# Patient Record
Sex: FEMALE | Race: WHITE | NOT HISPANIC OR LATINO | URBAN - METROPOLITAN AREA
[De-identification: names, ages, dates, MRNs, and addresses within clinical notes are randomized per-mention and may not be internally consistent; named-entity substitution may affect disease eponyms.]

---

## 2017-05-04 ENCOUNTER — OUTPATIENT (OUTPATIENT)
Dept: OUTPATIENT SERVICES | Facility: HOSPITAL | Age: 32
LOS: 1 days | End: 2017-05-04
Payer: COMMERCIAL

## 2017-05-04 DIAGNOSIS — Z00.00 ENCOUNTER FOR GENERAL ADULT MEDICAL EXAMINATION WITHOUT ABNORMAL FINDINGS: ICD-10-CM

## 2017-05-04 PROCEDURE — 36415 COLL VENOUS BLD VENIPUNCTURE: CPT

## 2017-05-04 PROCEDURE — 86480 TB TEST CELL IMMUN MEASURE: CPT

## 2017-05-05 LAB
M TB TUBERC IFN-G BLD QL: -0.02 IU/ML — SIGNIFICANT CHANGE UP
M TB TUBERC IFN-G BLD QL: 0.03 IU/ML — SIGNIFICANT CHANGE UP
M TB TUBERC IFN-G BLD QL: NEGATIVE — SIGNIFICANT CHANGE UP
MITOGEN IGNF BCKGRD COR BLD-ACNC: >10 IU/ML — SIGNIFICANT CHANGE UP

## 2018-10-15 ENCOUNTER — TRANSCRIPTION ENCOUNTER (OUTPATIENT)
Age: 33
End: 2018-10-15

## 2019-10-29 ENCOUNTER — RESULT REVIEW (OUTPATIENT)
Age: 34
End: 2019-10-29

## 2019-10-30 ENCOUNTER — APPOINTMENT (OUTPATIENT)
Dept: OBGYN | Facility: CLINIC | Age: 34
End: 2019-10-30
Payer: COMMERCIAL

## 2019-10-30 PROCEDURE — 36415 COLL VENOUS BLD VENIPUNCTURE: CPT

## 2019-10-30 PROCEDURE — 99385 PREV VISIT NEW AGE 18-39: CPT

## 2020-04-26 ENCOUNTER — MESSAGE (OUTPATIENT)
Age: 35
End: 2020-04-26

## 2020-05-26 ENCOUNTER — APPOINTMENT (OUTPATIENT)
Dept: DISASTER EMERGENCY | Facility: CLINIC | Age: 35
End: 2020-05-26

## 2020-05-26 LAB
SARS-COV-2 IGG SERPL IA-ACNC: 0.1 INDEX
SARS-COV-2 IGG SERPL QL IA: NEGATIVE

## 2022-01-03 LAB — SARS-COV-2 N GENE NPH QL NAA+PROBE: NOT DETECTED

## 2022-01-06 DIAGNOSIS — Z00.00 ENCOUNTER FOR GENERAL ADULT MEDICAL EXAMINATION W/OUT ABNORMAL FINDINGS: ICD-10-CM

## 2022-01-08 LAB — SARS-COV-2 N GENE NPH QL NAA+PROBE: NOT DETECTED

## 2022-08-18 DIAGNOSIS — J30.9 ALLERGIC RHINITIS, UNSPECIFIED: ICD-10-CM

## 2022-11-28 ENCOUNTER — NON-APPOINTMENT (OUTPATIENT)
Age: 37
End: 2022-11-28

## 2023-01-03 ENCOUNTER — APPOINTMENT (OUTPATIENT)
Dept: RADIOLOGY | Facility: IMAGING CENTER | Age: 38
End: 2023-01-03
Payer: COMMERCIAL

## 2023-01-03 ENCOUNTER — TRANSCRIPTION ENCOUNTER (OUTPATIENT)
Age: 38
End: 2023-01-03

## 2023-01-03 ENCOUNTER — APPOINTMENT (OUTPATIENT)
Dept: MRI IMAGING | Facility: IMAGING CENTER | Age: 38
End: 2023-01-03
Payer: COMMERCIAL

## 2023-01-03 ENCOUNTER — APPOINTMENT (OUTPATIENT)
Dept: SPINE | Facility: CLINIC | Age: 38
End: 2023-01-03
Payer: COMMERCIAL

## 2023-01-03 ENCOUNTER — NON-APPOINTMENT (OUTPATIENT)
Age: 38
End: 2023-01-03

## 2023-01-03 ENCOUNTER — OUTPATIENT (OUTPATIENT)
Dept: OUTPATIENT SERVICES | Facility: HOSPITAL | Age: 38
LOS: 1 days | End: 2023-01-03
Payer: COMMERCIAL

## 2023-01-03 VITALS
HEART RATE: 95 BPM | SYSTOLIC BLOOD PRESSURE: 137 MMHG | OXYGEN SATURATION: 95 % | BODY MASS INDEX: 24.33 KG/M2 | WEIGHT: 155 LBS | HEIGHT: 67 IN | DIASTOLIC BLOOD PRESSURE: 94 MMHG

## 2023-01-03 VITALS — BODY MASS INDEX: 39.94 KG/M2 | WEIGHT: 255 LBS

## 2023-01-03 DIAGNOSIS — M54.12 RADICULOPATHY, CERVICAL REGION: ICD-10-CM

## 2023-01-03 PROCEDURE — 72040 X-RAY EXAM NECK SPINE 2-3 VW: CPT | Mod: 26

## 2023-01-03 PROCEDURE — 72141 MRI NECK SPINE W/O DYE: CPT | Mod: 26

## 2023-01-03 PROCEDURE — 72141 MRI NECK SPINE W/O DYE: CPT

## 2023-01-03 PROCEDURE — 72040 X-RAY EXAM NECK SPINE 2-3 VW: CPT

## 2023-01-03 PROCEDURE — 99203 OFFICE O/P NEW LOW 30 MIN: CPT

## 2023-01-03 NOTE — REVIEW OF SYSTEMS
[As Noted in HPI] : as noted in HPI [Arm Weakness] : arm weakness [Hand Weakness] :  hand weakness [Negative] : Heme/Lymph

## 2023-01-03 NOTE — HISTORY OF PRESENT ILLNESS
[Other: ___] : [unfilled] [FreeTextEntry1] : neck pain with radiation into both shoulders and down the left arm with weakness of both arms which is worse on the left side. [de-identified] : CARTER KHAN is a 37 year old lady who reported that she had a bad cold about 1.5 weeks ago and was lying down on her right side and had a coughing attack.  She needed to turn her head to cough and felt sudden severe pain down the left arm.  She started a Medrol dose pack on Saturday but noticed that she had developed weakness in both arms but especially the left arm and has difficulty lifting her arm up.  She feels pain and pressure in her neck.  the patient denies tingling or numbness down her extremities.  She denies difficulty using her hands or difficulty walking.  She also denies bowel or bladder problems.  She has not started any physical therapy, chiropractics, acupuncture or interventional pain management.

## 2023-01-03 NOTE — ASSESSMENT
[FreeTextEntry1] : Activity levels and proper body mechanics were discussed.  The patient is to avoid bending and lifting and is to keep her phone and computer elevated.  It was recommended that she start physical therapy for arm strengthening and to relieve neck and arm pain.  The patient is to have cervical AP and lateral x rays and an MRI of the cervical spine as soon as possible to evaluate for cervical canal or foraminal stenosis which may be causing her weakness and pain.  She made an appointment to have the imaging tonight.  They will be reviewed tomorrow and the patient will be called with the results.  She is to follow up with Dr. Eddi Maya in the office on 01/09/2023.  the patient agreed with this plan.

## 2023-01-03 NOTE — PHYSICAL EXAM
[General Appearance - Alert] : alert [General Appearance - In No Acute Distress] : in no acute distress [General Appearance - Well Nourished] : well nourished [General Appearance - Well Developed] : well developed [General Appearance - Well-Appearing] : healthy appearing [] : normal voice and communication [FreeTextEntry1] : Is concerned about arm weakness. [Person] : oriented to person [Place] : oriented to place [Time] : oriented to time [Short Term Intact] : short term memory intact [Remote Intact] : remote memory intact [Span Intact] : the attention span was normal [Concentration Intact] : normal concentrating ability [Fluency] : fluency intact [Comprehension] : comprehension intact [Current Events] : adequate knowledge of current events [Past History] : adequate knowledge of personal past history [Vocabulary] : adequate range of vocabulary [Cranial Nerves Optic (II)] : visual acuity intact bilaterally,  pupils equal round and reactive to light [Cranial Nerves Oculomotor (III)] : extraocular motion intact [Cranial Nerves Trigeminal (V)] : facial sensation intact symmetrically [Cranial Nerves Facial (VII)] : face symmetrical [Cranial Nerves Vestibulocochlear (VIII)] : hearing was intact bilaterally [Cranial Nerves Glossopharyngeal (IX)] : tongue and palate midline [Cranial Nerves Accessory (XI - Cranial And Spinal)] : head turning and shoulder shrug symmetric [Cranial Nerves Hypoglossal (XII)] : there was no tongue deviation with protrusion [Motor Tone] : muscle tone was normal in all four extremities [Motor Strength] : muscle strength was normal in all four extremities [No Muscle Atrophy] : normal bulk in all four extremities [4] : C5 deltoid 4/5 [3] : C5 deltoid 3/5 [5] : S1 toe walking 5/5 [Sensation Tactile Decrease] : light touch was intact [Abnormal Walk] : normal gait [Balance] : balance was intact [Past-pointing] : there was no past-pointing [Tremor] : no tremor present [1+] : Brachioradialis left 1+ [2+] : Ankle jerk left 2+ [Plantar Reflex Right Only] : normal on the right [Plantar Reflex Left Only] : normal on the left [___] : absent on the right [___] : absent on the left [Horner] : Horner's sign was not demonstrated

## 2023-01-03 NOTE — REASON FOR VISIT
[New Patient Visit] : a new patient visit [Referred By: _________] : Patient was referred by FAZAL [FreeTextEntry1] : The patient reported having neck pain which radiates into both shoulders and down the left arm with weakness in both arms which is worse on the left side.

## 2023-01-04 RX ORDER — IPRATROPIUM BROMIDE 17 UG/1
17 AEROSOL, METERED RESPIRATORY (INHALATION) 4 TIMES DAILY
Qty: 1 | Refills: 5 | Status: DISCONTINUED | COMMUNITY
Start: 2022-08-18 | End: 2023-01-04

## 2023-01-09 ENCOUNTER — APPOINTMENT (OUTPATIENT)
Dept: SPINE | Facility: CLINIC | Age: 38
End: 2023-01-09
Payer: COMMERCIAL

## 2023-01-09 VITALS
DIASTOLIC BLOOD PRESSURE: 96 MMHG | OXYGEN SATURATION: 96 % | BODY MASS INDEX: 40.02 KG/M2 | SYSTOLIC BLOOD PRESSURE: 136 MMHG | HEART RATE: 107 BPM | HEIGHT: 67 IN | WEIGHT: 255 LBS

## 2023-01-09 DIAGNOSIS — M54.12 RADICULOPATHY, CERVICAL REGION: ICD-10-CM

## 2023-01-09 DIAGNOSIS — G62.9 POLYNEUROPATHY, UNSPECIFIED: ICD-10-CM

## 2023-01-09 PROCEDURE — 99213 OFFICE O/P EST LOW 20 MIN: CPT

## 2023-01-11 NOTE — REASON FOR VISIT
[Follow-Up: _____] : a [unfilled] follow-up visit [Other: _____] : [unfilled] [FreeTextEntry1] : Ms. Baez states she begun experiencing neck pain that radiates into bilateral shoulders on 12/20/2022 and weakness of bilateral upper extremities left greater than right on 12/30/2022. She has noted significant improvement with a Medrol Dosepak. Pain level 4/10. She is scheduled to start physical therapy this week. MRI of cervical spine and cervical x-ray reviewed today.  An MRI of the cervical spine does not show any disc herniation stenosis or neural impingement.  X-rays which were not done with flexion-extension were normal.\par

## 2023-01-11 NOTE — HISTORY OF PRESENT ILLNESS
[FreeTextEntry1] : 37 year old lady presented on 1/3/2022 with weakness in both arms but especially the left arm and has difficulty lifting her arm up for 2.5 weeks.  In additions she described pain and pressure in her neck. The patient reported she had a bad cold about 3 weeks ago and was lying down on her right side and had a coughing attack. She needed to turn her head to cough and felt sudden severe pain down the left arm. She started a Medrol dose pack on Saturday but noticed that she developed weakness of both arms. Denies tingling or numbness of extremities, gait instability, difficulty with fine or gross motor skills, bowel or bladder dysfunction.\par \par

## 2023-01-11 NOTE — ASSESSMENT
[Peripheral nerve disorder (355.9 / G64)] : percentile score [FreeTextEntry1] : 37 year old female with several weeks of bilateral deltoid weakness.  Pain has improved with a Medrol Dosepak but weakness continues.  The etiology of the symptoms remains unclear. MRI of Cervical spine does not show any neural impingement. Cervical flexion and extension x-rays ordered. Recommend doing an EMG of upper extremities. She will return after obtaining images and EMG.

## 2023-01-11 NOTE — PHYSICAL EXAM
[Sensation Tactile Decrease] : light touch was intact [Abnormal Walk] : normal gait [Horner] : Horner's sign was not demonstrated [FreeTextEntry6] : Bilateral deltoid weakness 4/5 left worse than right

## 2023-01-11 NOTE — RESULTS/DATA
[FreeTextEntry1] : MRI does not show any disc herniation stenosis or neural impingement.  X-rays which were not done with flexion-extension were normal.

## 2023-01-11 NOTE — DATA REVIEWED
[de-identified] : Cervical spine from [facility] on 1/3/2023 [de-identified] : Cervical spine from [facility] on 1/3/2023

## 2023-01-12 ENCOUNTER — APPOINTMENT (OUTPATIENT)
Dept: NEUROLOGY | Facility: CLINIC | Age: 38
End: 2023-01-12
Payer: COMMERCIAL

## 2023-01-12 VITALS — HEIGHT: 67 IN | HEART RATE: 120 BPM | SYSTOLIC BLOOD PRESSURE: 170 MMHG | DIASTOLIC BLOOD PRESSURE: 100 MMHG

## 2023-01-12 PROCEDURE — 99204 OFFICE O/P NEW MOD 45 MIN: CPT

## 2023-01-12 RX ORDER — TIZANIDINE 4 MG/1
4 TABLET ORAL
Refills: 0 | Status: DISCONTINUED | COMMUNITY
End: 2023-01-12

## 2023-01-12 NOTE — ASSESSMENT
[FreeTextEntry1] : Dr. Chambers's history and examination are consistent with bilateral brachial neuritis.  MRI of the cervical spine does not reveal a cause.\par \par I suggested that she institute physical therapy to prevent development of frozen shoulders.  I will order MRIs of the brachial plexus with and without contrast and an ultrasound of the chest to assess diaphragmatic function.  She will undergo a comprehensive serologic evaluation.  These studies can be followed by EMG and nerve conduction studies at an appropriate interval.  Further management will depend upon these results and her clinical course.

## 2023-01-12 NOTE — PHYSICAL EXAM
[FreeTextEntry1] : Constitutional:  Patient was well-developed, well-nourished and in no acute distress. \par \par Head:  Normocephalic, atraumatic. Tympanic membranes were clear. \par \par Neck:  Supple with full range of motion. \par \par Cardiovascular:  Cardiac rhythm was regular without murmur. There were no carotid bruits. Peripheral pulses were full and symmetric. \par \par Respiratory:  Lungs were clear. \par \par Abdomen:  Soft and nontender. \par \par Spine:  Nontender. \par \par Skin:  There were no rashes. \par \par NEUROLOGICAL EXAMINATION:\par \par Mental Status: Patient was alert and oriented. Speech was fluent. There was no dysarthria. \par \par Cranial Nerves: \par \par II: Visual acuity was 20/ 20 bilaterally with near card. Pupils were equal and reactive. Visual fields were full. Funduscopic examination was normal. \par \par III, IV, VI:  Eye movements were full without nystagmus. \par \par V: Facial sensation was intact. \par \par VII: Facial strength was normal. \par \par VIII: Hearing was equal. \par \par IX, X: Palatal movement was normal. Phonation was normal. \par \par XI: Sternocleidomastoids and trapezii were normal. \par \par XII: Tongue was midline and movements normal. There was no lingual atrophy or fasciculations. \par \par Motor Examination: Muscle bulk, tone and strength were normal except for the shoulder girdles.  There was no winging of the scapulas.  There was severe weakness of shoulder abduction and external rotation bilaterally, left worse than right.  There was weakness of elbow flexion and forearm supination.  Triceps and all other forearm and hand muscles were 5 out of 5.\par \par Sensory Examination: Pinprick, vibration and joint position sense were intact. \par \par Reflexes: DTRs were absent at the biceps, triceps and brachioradialis and 2+ at the knees and ankles. \par \par Plantar Responses: Plantar responses were flexor. \par \par Coordination/Cerebellar Function: There was no dysmetria on finger to nose or heel to shin testing. \par \par Gait/Stance: Gait and tandem were normal. Romberg was negative.\par

## 2023-01-12 NOTE — CONSULT LETTER
[Dear  ___] : Dear  [unfilled], [Consult Letter:] : I had the pleasure of evaluating your patient, [unfilled]. [Please see my note below.] : Please see my note below. [Consult Closing:] : Thank you very much for allowing me to participate in the care of this patient.  If you have any questions, please do not hesitate to contact me. [Sincerely,] : Sincerely, [FreeTextEntry3] : Manolo Wayne MD\par

## 2023-01-16 ENCOUNTER — LABORATORY RESULT (OUTPATIENT)
Age: 38
End: 2023-01-16

## 2023-01-17 ENCOUNTER — APPOINTMENT (OUTPATIENT)
Dept: MRI IMAGING | Facility: CLINIC | Age: 38
End: 2023-01-17
Payer: COMMERCIAL

## 2023-01-17 ENCOUNTER — NON-APPOINTMENT (OUTPATIENT)
Age: 38
End: 2023-01-17

## 2023-01-17 ENCOUNTER — OUTPATIENT (OUTPATIENT)
Dept: OUTPATIENT SERVICES | Facility: HOSPITAL | Age: 38
LOS: 1 days | End: 2023-01-17

## 2023-01-17 LAB
ALBUMIN SERPL ELPH-MCNC: 4.2 G/DL
ALP BLD-CCNC: 91 U/L
ALT SERPL-CCNC: 32 U/L
ANACR T: NEGATIVE
ANION GAP SERPL CALC-SCNC: 13 MMOL/L
APTT BLD: 29.9 SEC
AST SERPL-CCNC: 18 U/L
BASOPHILS # BLD AUTO: 0.06 K/UL
BASOPHILS NFR BLD AUTO: 0.6 %
BILIRUB SERPL-MCNC: 0.2 MG/DL
BUN SERPL-MCNC: 15 MG/DL
CALCIUM SERPL-MCNC: 9.3 MG/DL
CHLORIDE SERPL-SCNC: 100 MMOL/L
CK SERPL-CCNC: 33 U/L
CO2 SERPL-SCNC: 25 MMOL/L
COVID-19 NUCLEOCAPSID  GAM ANTIBODY INTERPRETATION: POSITIVE
COVID-19 SPIKE DOMAIN ANTIBODY INTERPRETATION: POSITIVE
CREAT SERPL-MCNC: 0.66 MG/DL
CRP SERPL-MCNC: 33 MG/L
EGFR: 115 ML/MIN/1.73M2
EOSINOPHIL # BLD AUTO: 0.07 K/UL
EOSINOPHIL NFR BLD AUTO: 0.7 %
ERYTHROCYTE [SEDIMENTATION RATE] IN BLOOD BY WESTERGREN METHOD: 41 MM/HR
ESTIMATED AVERAGE GLUCOSE: 157 MG/DL
GLUCOSE SERPL-MCNC: 109 MG/DL
HBA1C MFR BLD HPLC: 7.1 %
HCT VFR BLD CALC: 43.5 %
HCYS SERPL-MCNC: 8.1 UMOL/L
HGB BLD-MCNC: 14.1 G/DL
IMM GRANULOCYTES NFR BLD AUTO: 0.7 %
LYMPHOCYTES # BLD AUTO: 2.95 K/UL
LYMPHOCYTES NFR BLD AUTO: 28.5 %
MAN DIFF?: NORMAL
MCHC RBC-ENTMCNC: 28.7 PG
MCHC RBC-ENTMCNC: 32.4 GM/DL
MCV RBC AUTO: 88.4 FL
MONOCYTES # BLD AUTO: 0.81 K/UL
MONOCYTES NFR BLD AUTO: 7.8 %
NEUTROPHILS # BLD AUTO: 6.39 K/UL
NEUTROPHILS NFR BLD AUTO: 61.7 %
PLATELET # BLD AUTO: 289 K/UL
POTASSIUM SERPL-SCNC: 3.9 MMOL/L
PROT SERPL-MCNC: 7.4 G/DL
RBC # BLD: 4.92 M/UL
RBC # FLD: 13.2 %
RHEUMATOID FACT SER QL: <10 IU/ML
SARS-COV-2 AB SERPL IA-ACNC: >250 U/ML
SARS-COV-2 AB SERPL QL IA: 99.4 INDEX
SODIUM SERPL-SCNC: 138 MMOL/L
THYROGLOB AB SERPL-ACNC: <20 IU/ML
THYROPEROXIDASE AB SERPL IA-ACNC: <10 IU/ML
TSH SERPL-ACNC: 1.16 UIU/ML
VIT B12 SERPL-MCNC: 611 PG/ML
WBC # FLD AUTO: 10.35 K/UL

## 2023-01-17 PROCEDURE — 71552 MRI CHEST W/O & W/DYE: CPT | Mod: 26

## 2023-01-18 DIAGNOSIS — R91.1 SOLITARY PULMONARY NODULE: ICD-10-CM

## 2023-01-18 LAB
CCP AB SER IA-ACNC: <8 UNITS
RF+CCP IGG SER-IMP: NEGATIVE

## 2023-01-19 ENCOUNTER — LABORATORY RESULT (OUTPATIENT)
Age: 38
End: 2023-01-19

## 2023-01-19 LAB
ALBUMIN MFR SERPL ELPH: 51.9 %
ALBUMIN SERPL-MCNC: 3.8 G/DL
ALBUMIN/GLOB SERPL: 1.1 RATIO
ALPHA1 GLOB MFR SERPL ELPH: 4.7 %
ALPHA1 GLOB SERPL ELPH-MCNC: 0.3 G/DL
ALPHA2 GLOB MFR SERPL ELPH: 11.1 %
ALPHA2 GLOB SERPL ELPH-MCNC: 0.8 G/DL
ASIALO-GM1 ANTIBODIES, IGG/IGM: 12 IV
B-GLOBULIN MFR SERPL ELPH: 14.6 %
B-GLOBULIN SERPL ELPH-MCNC: 1.1 G/DL
DEPRECATED KAPPA LC FREE/LAMBDA SER: 1.65 RATIO
GAMMA GLOB FLD ELPH-MCNC: 1.3 G/DL
GAMMA GLOB MFR SERPL ELPH: 17.7 %
GD1A ANTIBODIES, IGG/IGM: NORMAL IV
GD1B ANTIBODIES, IGG/IGM: 10 IV
GM1 ANTIBODIES, IGG/IGM: 10 IV
GM2 ANTIBODIES, IGG/IGM: 18 IV
GQ1B ANTIBODIES, IGG/IGM: 7 IV
IGA SER QL IEP: 489 MG/DL
IGG SER QL IEP: 1258 MG/DL
IGM SER QL IEP: 98 MG/DL
INTERPRETATION SERPL IEP-IMP: NORMAL
KAPPA LC CSF-MCNC: 1.81 MG/DL
KAPPA LC SERPL-MCNC: 2.99 MG/DL
M PROTEIN SPEC IFE-MCNC: NORMAL
PROT SERPL-MCNC: 7.4 G/DL
PROT SERPL-MCNC: 7.4 G/DL

## 2023-01-20 LAB
METHYLMALONATE SERPL-SCNC: 102 NMOL/L
VIT B1 SERPL-MCNC: 148.7 NMOL/L

## 2023-01-23 LAB — MAG AB SER QL: NEGATIVE

## 2023-01-24 LAB
A PHAGOCYTOPH IGG TITR SER IF: NORMAL TITER
AMPA-R ABCBA: NEGATIVE
AMPHIPHYSIN IGG TITR SER IF: NEGATIVE TITER
B BURGDOR AB SER QL IA: NEGATIVE
B MICROTI IGG TITR SER: NORMAL TITER
CASPR2-IGG CBA, S: NEGATIVE
CV2 IGG TITR SER: NEGATIVE TITER
E CHAFFEENSIS IGG TITR SER IF: NORMAL TITER
GABA-B ABCBA: NEGATIVE
GAD65 AB SER-MCNC: 0.01 NMOL/L
GLIAL NUC TYPE 1 AB TITR SER: NEGATIVE TITER
HU1 AB TITR SER: NEGATIVE TITER
HU2 AB TITR SER IF: NEGATIVE TITER
HU3 AB TITR SER: NEGATIVE TITER
IGLON5 IFA, S: NEGATIVE
IMMUNOLOGIST REVIEW: NORMAL
LGI1-IGG CBA, S: NEGATIVE
NIF IFA, S: NEGATIVE
NMDA-R ABCBA: NEGATIVE
PCA-1 AB TITR SER: NEGATIVE TITER
PCA-2 AB TITR SER: NEGATIVE TITER
PCA-TR AB TITR SER: NEGATIVE TITER
REFLEX ADDED: NORMAL

## 2023-01-26 LAB — IGA 24H UR QL IFE: NORMAL

## 2023-02-01 ENCOUNTER — APPOINTMENT (OUTPATIENT)
Dept: NEUROLOGY | Facility: CLINIC | Age: 38
End: 2023-02-01
Payer: COMMERCIAL

## 2023-02-01 PROCEDURE — 96366 THER/PROPH/DIAG IV INF ADDON: CPT

## 2023-02-01 PROCEDURE — 96365 THER/PROPH/DIAG IV INF INIT: CPT

## 2023-02-07 ENCOUNTER — APPOINTMENT (OUTPATIENT)
Dept: NEUROLOGY | Facility: CLINIC | Age: 38
End: 2023-02-07
Payer: COMMERCIAL

## 2023-02-07 DIAGNOSIS — M54.10 RADICULOPATHY, SITE UNSPECIFIED: ICD-10-CM

## 2023-02-07 LAB — HEREDITARY NEUROPATHY PANEL: NEGATIVE

## 2023-02-07 PROCEDURE — 95913 NRV CNDJ TEST 13/> STUDIES: CPT

## 2023-02-07 PROCEDURE — 95886 MUSC TEST DONE W/N TEST COMP: CPT | Mod: RT

## 2023-02-07 NOTE — PROCEDURE
[FreeTextEntry1] : Nerve conduction studies and electromyography [FreeTextEntry2] : Wang Parsonage syndrome, bilateral brachial plexopathy [FreeTextEntry3] : Electrodiagnostic testing was performed in the upper extremities.\par \par The radial, median, ulnar and lateral antebrachial cutaneous sensory potentials and conduction velocities were normal.\par \par The median and ulnar motor distal latencies, compound muscle action potentials and conduction velocities were normal.  The radial motor distal latencies and conduction velocities were normal but the compound muscle action potentials were mildly low in amplitude possibly on a technical basis.\par \par The left ulnar and both median F waves were normal but mildly and persistent.\par \par Needle electromyography was performed in several bilateral upper extremity and cervical paraspinal muscles.  There was active denervation in all C5 and C6 innervated muscles tested except for the paraspinal muscles.  Motor units and recruitment patterns were normal except for mildly decreased recruitment in the left deltoid and supraspinatus muscles.\par \par Conclusions: This was an abnormal study.  There was electrophysiologic evidence of bilateral acute upper trunk brachial plexopathy.  There was no paraspinal muscle denervation to suggest cervical radiculopathy.  The left C5 segment appeared preferentially involved.  The other motor axonal injuries appeared mild.  There was no electrophysiologic evidence of median or ulnar neuropathy or sensorimotor polyneuropathy.

## 2023-02-17 ENCOUNTER — OUTPATIENT (OUTPATIENT)
Dept: OUTPATIENT SERVICES | Facility: HOSPITAL | Age: 38
LOS: 1 days | End: 2023-02-17
Payer: COMMERCIAL

## 2023-02-17 ENCOUNTER — APPOINTMENT (OUTPATIENT)
Dept: ULTRASOUND IMAGING | Facility: CLINIC | Age: 38
End: 2023-02-17
Payer: COMMERCIAL

## 2023-02-17 ENCOUNTER — APPOINTMENT (OUTPATIENT)
Dept: CT IMAGING | Facility: CLINIC | Age: 38
End: 2023-02-17
Payer: COMMERCIAL

## 2023-02-17 DIAGNOSIS — R91.1 SOLITARY PULMONARY NODULE: ICD-10-CM

## 2023-02-17 DIAGNOSIS — M54.10 RADICULOPATHY, SITE UNSPECIFIED: ICD-10-CM

## 2023-02-17 PROCEDURE — 76604 US EXAM CHEST: CPT | Mod: 26

## 2023-02-17 PROCEDURE — 71250 CT THORAX DX C-: CPT

## 2023-02-17 PROCEDURE — 76604 US EXAM CHEST: CPT

## 2023-02-17 PROCEDURE — 71250 CT THORAX DX C-: CPT | Mod: 26

## 2023-02-21 LAB — SENSORIMOTOR NEUROPATHY PROFILE W/ RECOMBX: NORMAL

## 2023-08-30 ENCOUNTER — NON-APPOINTMENT (OUTPATIENT)
Age: 38
End: 2023-08-30

## 2023-08-30 ENCOUNTER — APPOINTMENT (OUTPATIENT)
Dept: ORTHOPEDIC SURGERY | Facility: CLINIC | Age: 38
End: 2023-08-30
Payer: COMMERCIAL

## 2023-08-30 VITALS
HEIGHT: 67 IN | SYSTOLIC BLOOD PRESSURE: 139 MMHG | DIASTOLIC BLOOD PRESSURE: 101 MMHG | WEIGHT: 250 LBS | BODY MASS INDEX: 39.24 KG/M2 | HEART RATE: 118 BPM

## 2023-08-30 PROCEDURE — 99204 OFFICE O/P NEW MOD 45 MIN: CPT

## 2023-08-30 PROCEDURE — 73630 X-RAY EXAM OF FOOT: CPT | Mod: LT

## 2023-08-30 NOTE — DISCUSSION/SUMMARY
[de-identified] : I discussed nonoperative treatment options for their left great toe contusion. We discussed nonoperative treatments options including activity modification, therapy, bracing, nonsteroidal anti-inflammatory medications, and injections. The patient would like to continue with nonoperative treatment and would like to proceed with activity modification and over-the-counter anti-inflammatories as needed.   I discussed with them that I often prescribe an anti-inflammatory that should be taken once a day with meals to decrease pain and expedite symptom relief. They should not take this while also taking Aleve (Naprosyn), Motrin/ Advil (Ibuprofen), Toradol (ketoralac). They must stop taking it if they develops stomach pain, increased bleeding or bruising and they should follow-up with their primary care doctor for routine blood work including kidney function to monitor its effect. While it Is not a habit-forming substance, it should only  be taken as needed and to discontinue use once symptoms have resolved.  They prefer to continue with over-the-counter medication  My cumulative time spent on this patients visit included: Preparation for the visit, review of the medical records, review of pertinent diagnostic studies, examination and counseling of the patient on the above diagnosis, treatment plan and prognosis, orders of diagnostic tests, medications and/or appropriate procedures and documentation in the medical records of todays visit.
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
## 2023-08-30 NOTE — PHYSICAL EXAM
[de-identified] : Constitutional: Well-nourished, well-developed, No acute distress  Respiratory:  Good respiratory effort, no SOB  Lymphatic: No regional lymphadenopathy, no lymphedema  Psychiatric: Pleasant and normal affect, alert and oriented x3  Skin: Clean dry and intact B/L UE/LE Musculoskeletal: normal except where as noted in regional exam     Left foot/ankle  APPEARANCE: no marked deformities,or malalignment.  Mild swelling of the base of the great toe MTP and PIP POSITIVE TENDERNESS: Mild tenderness palpation along the great toe NONTENDER: medial malleolus, lateral malleolus, tibialis posterior tendon, achilles tendon, no marked thickening of tendon, ATFL, CFL, PTFL, anterior tibiofibular ligament (high ankle), sinus tarsus, deltoid ligaments, 5th metatarsal.   RANGE OF MOTION: Able to flex and extend at the great toe MTP and IP joint SPECIAL TESTS: neg anterior drawer. neg talar tilt.  NEURO: Normal sensation of LE    [de-identified] :  3 views of the left great toe were obtained today that show no fracture.  There is no malalignment. There is no joint dislocation, or degenerative changes seen. No other obvious osseous abnormality. Otherwise unremarkable.

## 2023-09-04 RX ORDER — METHYLPREDNISOLONE 4 MG/1
4 TABLET ORAL
Qty: 1 | Refills: 0 | Status: DISCONTINUED | COMMUNITY
Start: 2023-01-11 | End: 2023-09-04

## 2024-01-25 ENCOUNTER — APPOINTMENT (OUTPATIENT)
Dept: HUMAN REPRODUCTION | Facility: CLINIC | Age: 39
End: 2024-01-25
Payer: COMMERCIAL

## 2024-01-25 PROCEDURE — 36415 COLL VENOUS BLD VENIPUNCTURE: CPT

## 2024-01-25 PROCEDURE — 99205 OFFICE O/P NEW HI 60 MIN: CPT | Mod: 25

## 2024-01-25 PROCEDURE — 76830 TRANSVAGINAL US NON-OB: CPT

## 2024-02-21 ENCOUNTER — APPOINTMENT (OUTPATIENT)
Dept: HUMAN REPRODUCTION | Facility: CLINIC | Age: 39
End: 2024-02-21
Payer: COMMERCIAL

## 2024-02-21 PROCEDURE — 99215 OFFICE O/P EST HI 40 MIN: CPT

## 2024-03-01 ENCOUNTER — APPOINTMENT (OUTPATIENT)
Dept: ENDOCRINOLOGY | Facility: CLINIC | Age: 39
End: 2024-03-01
Payer: COMMERCIAL

## 2024-03-01 VITALS
BODY MASS INDEX: 39.55 KG/M2 | HEIGHT: 67 IN | OXYGEN SATURATION: 95 % | HEART RATE: 103 BPM | DIASTOLIC BLOOD PRESSURE: 101 MMHG | WEIGHT: 252 LBS | SYSTOLIC BLOOD PRESSURE: 142 MMHG | TEMPERATURE: 97.7 F

## 2024-03-01 DIAGNOSIS — I10 ESSENTIAL (PRIMARY) HYPERTENSION: ICD-10-CM

## 2024-03-01 DIAGNOSIS — E66.9 OBESITY, UNSPECIFIED: ICD-10-CM

## 2024-03-01 DIAGNOSIS — E66.9 TYPE 2 DIABETES MELLITUS WITH OTHER SPECIFIED COMPLICATION: ICD-10-CM

## 2024-03-01 DIAGNOSIS — E11.69 TYPE 2 DIABETES MELLITUS WITH OTHER SPECIFIED COMPLICATION: ICD-10-CM

## 2024-03-01 DIAGNOSIS — E78.00 PURE HYPERCHOLESTEROLEMIA, UNSPECIFIED: ICD-10-CM

## 2024-03-01 LAB — HBA1C MFR BLD HPLC: 9.7

## 2024-03-01 PROCEDURE — 83036 HEMOGLOBIN GLYCOSYLATED A1C: CPT | Mod: QW

## 2024-03-01 PROCEDURE — 99205 OFFICE O/P NEW HI 60 MIN: CPT

## 2024-03-01 RX ORDER — BLOOD-GLUCOSE,RECEIVER,CONT
EACH MISCELLANEOUS
Qty: 1 | Refills: 0 | Status: ACTIVE | COMMUNITY
Start: 2024-03-01 | End: 1900-01-01

## 2024-03-01 RX ORDER — IMMUNE GLOBULIN INFUSION (HUMAN) 100 MG/ML
10 INJECTION, SOLUTION INTRAVENOUS; SUBCUTANEOUS
Qty: 17 | Refills: 4 | Status: DISCONTINUED | OUTPATIENT
Start: 2023-01-20 | End: 2024-03-01

## 2024-03-01 RX ORDER — BLOOD-GLUCOSE SENSOR
EACH MISCELLANEOUS
Qty: 2 | Refills: 3 | Status: ACTIVE | COMMUNITY
Start: 2024-03-01 | End: 1900-01-01

## 2024-03-01 NOTE — REASON FOR VISIT
[Initial Evaluation] : an initial evaluation [Weight Management/Obesity] : weight management/obesity [DM Type 2] : DM Type 2

## 2024-03-01 NOTE — ASSESSMENT
[Diabetes Foot Care] : diabetes foot care [Long Term Vascular Complications] : long term vascular complications of diabetes [Importance of Diet and Exercise] : importance of diet and exercise to improve glycemic control, achieve weight loss and improve cardiovascular health [Self Monitoring of Blood Glucose] : self monitoring of blood glucose [Retinopathy Screening] : Patient was referred to ophthalmology for retinopathy screening [Weight Loss] : weight loss [FreeTextEntry1] : Patient is a 38 yo woman with uncontrolled diabetes and BMI 39  1. Type 2 diabetes uncontrolled requiring high medical decision making -the patient was diagnosed with diabetes around 2022 based on serum A1c of 7.2%.  She was not started on any medicines and was loss to medical follow up.  She decided to pursue cryopreservation this year and was told A1c was > 10% January 2024. Patient has established PCP care and is coming today for endocrine management -since her diagnosis, she has made significant changes to her diet and is exercising.  She is motivated to change -because the fertility treatments are a priority and on hold until A1c is at goal, I offered basal/bolus insulin. Patient declined in favor of oral medicines and weekly GLP 1 agonist -start metformin 1000 mg BID; GI side effects including diarrhea were discussed; patient would benefit from tirzepatide.  Rx for 2.5 mg weekly sent to pharmacy. Side effects including pancreatitis, early signal for depression were discussed. Denies personal/family hx of MEN/MTC -POCT A1c today is 9.7% -the patient was educated that GLP 1 agonists and metformin are NOT approved during pregnancy or breast feeding.  The effects during fertility treatment also remains unknown as drug trials are not commonly done in women of child bearing age/pregnancy -refer to nutrition -encourage glucometer check; teaching provided -CGM sent for use as well -monofilament testing done -screen for nephropathy -the patient will have to discontinue GLP 1 agonist before any anesthesia or surgery  2. Class II Obesity/BMI 39 Patient states struggles with weight loss over a long period of time. She has made changes to her diet since January 2024 -recommend nutritional counseling -discussed concept of calorie restriction -can continue with time restricted feeding.  3. HTN -patient has some nervousness today -repeat BP at next visit; managed by PCP  Follow up in 3-4 months

## 2024-03-01 NOTE — PHYSICAL EXAM
[Alert] : alert [No Acute Distress] : no acute distress [Thyroid Not Enlarged] : the thyroid was not enlarged [No Respiratory Distress] : no respiratory distress [Clear to Auscultation] : lungs were clear to auscultation bilaterally [Normal Bowel Sounds] : normal bowel sounds [Soft] : abdomen soft [No Rash] : no rash [Left foot was examined, including] : left foot ~C was examined, including visual inspection with sensory and pulse exams [Right foot was examined, including] : right foot ~C was examined, including visual inspection with sensory and pulse exams [2+] : 2+ in the dorsalis pedis [Oriented x3] : oriented to person, place, and time [Normal Affect] : the affect was normal [Normal Insight/Judgement] : insight and judgment were intact [Normal Mood] : the mood was normal [Foot Ulcers] : no foot ulcers [#1 Diminished] : number 1 was normal [#2 Diminished] : number 2 was normal [#3 Diminished] : number 3 was normal [#5 Diminished] : number 5 was normal [#4 Diminished] : number 4 was normal [#6 Diminished] : number 6 was normal [#7 Diminished] : number 7 was normal [#8 Diminished] : number 8 was normal [#10 Diminished] : number 10 was normal [#9 Diminished] : number 9 was normal

## 2024-03-01 NOTE — HISTORY OF PRESENT ILLNESS
[FreeTextEntry1] : Patient is a 38 yo woman here for weight consultation and diabetes.    She states she has always been the "fluffy daughter." Sister and parents were normal weight.  Patient reports she had always been "chubby." There was overweight as a child but no obesity.  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  She admits to being an emotional eater so medical school and training contributed to weight gain.  There was gradual weight gain.  Then, in the beginning of COVID, she had a really bad relationship change and it was a difficult time. There was admitted unhealthy eating and poor sleep.  December 2022, patient developed neuritis and sought medical care for the first time.  A1c was 7.2% at that time.  Patient thought she could handle it by diet and exercise.  She ate healthy and then returned to prior eating habits.  When she turned 39 years old this year, she saw fertility specialist and A1c was 10.2% January 2024.  She established PCP care February 2024 and is here today.  Says she joined the gym and is exercising, more mindful of food, managing stress and sleeping better. PCP wanted to start Moun[employer] but it was just denied.  The patient states that the past few decades have been hard for her. There has been weight gain and she is coming out of denial requesting help with weight. She recently started to do IF, fasts 14 hours daily.  Right now, she is doing time restricted feeding and window is between 11 AM - 8 PM.  Never did commercial meal programs Meal #1: starts off with lunch; sandwich or salad with protein with vegetables; whole wheat bread.  Prepared foods at home or orders food from [employer]. Will order food once a week.  Prior to this change, she used to buy food daily.  She loves pasta, bread, carbohydrates. Meal #2: dinner is usually meat and potatoes, vegetables, She used to snack frequently but has cut down on snacking.  Patient will have apple with peanut butter if craving sweets. In the past, she used to eat cookies. Used to drink soda and juice occasionally but not anymore She joined [employer] and exercises 2-3 times a week  Type 2 diabetes diagnosed around 2022 Not currently on medicine Has not had dilated eye exam Periods are regular, except when things are stressful

## 2024-03-04 LAB
CREAT SPEC-SCNC: 181 MG/DL
MICROALBUMIN 24H UR DL<=1MG/L-MCNC: 1.3 MG/DL
MICROALBUMIN/CREAT 24H UR-RTO: 7 MG/G

## 2024-03-05 RX ORDER — LANCETS
EACH MISCELLANEOUS
Qty: 1 | Refills: 3 | Status: DISCONTINUED | COMMUNITY
Start: 2024-03-01 | End: 2024-03-05

## 2024-03-05 RX ORDER — BLOOD SUGAR DIAGNOSTIC
STRIP MISCELLANEOUS
Qty: 100 | Refills: 3 | Status: DISCONTINUED | COMMUNITY
Start: 2024-03-01 | End: 2024-03-05

## 2024-03-05 RX ORDER — TIRZEPATIDE 2.5 MG/.5ML
2.5 INJECTION, SOLUTION SUBCUTANEOUS
Qty: 4 | Refills: 3 | Status: ACTIVE | COMMUNITY
Start: 2024-03-01 | End: 1900-01-01

## 2024-03-05 RX ORDER — LANCETS 33 GAUGE
EACH MISCELLANEOUS
Qty: 180 | Refills: 0 | Status: ACTIVE | COMMUNITY
Start: 2024-03-05 | End: 1900-01-01

## 2024-03-05 RX ORDER — BLOOD SUGAR DIAGNOSTIC
STRIP MISCELLANEOUS TWICE DAILY
Qty: 2 | Refills: 3 | Status: ACTIVE | COMMUNITY
Start: 2024-03-05 | End: 1900-01-01

## 2024-03-05 RX ORDER — BLOOD-GLUCOSE METER
W/DEVICE EACH MISCELLANEOUS
Qty: 1 | Refills: 3 | Status: DISCONTINUED | COMMUNITY
Start: 2024-03-01 | End: 2024-03-05

## 2024-03-05 RX ORDER — BLOOD-GLUCOSE METER
W/DEVICE EACH MISCELLANEOUS
Qty: 1 | Refills: 0 | Status: ACTIVE | COMMUNITY
Start: 2024-03-05 | End: 1900-01-01

## 2024-03-22 ENCOUNTER — TRANSCRIPTION ENCOUNTER (OUTPATIENT)
Age: 39
End: 2024-03-22

## 2024-03-22 RX ORDER — SEMAGLUTIDE 0.68 MG/ML
2 INJECTION, SOLUTION SUBCUTANEOUS
Qty: 4 | Refills: 3 | Status: ACTIVE | COMMUNITY
Start: 2024-03-22 | End: 1900-01-01

## 2024-04-03 ENCOUNTER — APPOINTMENT (OUTPATIENT)
Dept: ULTRASOUND IMAGING | Facility: CLINIC | Age: 39
End: 2024-04-03

## 2024-04-03 ENCOUNTER — APPOINTMENT (OUTPATIENT)
Dept: CT IMAGING | Facility: CLINIC | Age: 39
End: 2024-04-03

## 2024-04-03 ENCOUNTER — APPOINTMENT (OUTPATIENT)
Dept: MAMMOGRAPHY | Facility: CLINIC | Age: 39
End: 2024-04-03

## 2024-04-04 ENCOUNTER — APPOINTMENT (OUTPATIENT)
Dept: CT IMAGING | Facility: IMAGING CENTER | Age: 39
End: 2024-04-04

## 2024-04-04 ENCOUNTER — APPOINTMENT (OUTPATIENT)
Dept: MAMMOGRAPHY | Facility: IMAGING CENTER | Age: 39
End: 2024-04-04

## 2024-04-04 ENCOUNTER — APPOINTMENT (OUTPATIENT)
Dept: ULTRASOUND IMAGING | Facility: IMAGING CENTER | Age: 39
End: 2024-04-04

## 2024-04-05 ENCOUNTER — APPOINTMENT (OUTPATIENT)
Dept: ULTRASOUND IMAGING | Facility: IMAGING CENTER | Age: 39
End: 2024-04-05

## 2024-04-05 ENCOUNTER — APPOINTMENT (OUTPATIENT)
Dept: CT IMAGING | Facility: IMAGING CENTER | Age: 39
End: 2024-04-05
Payer: COMMERCIAL

## 2024-04-05 ENCOUNTER — OUTPATIENT (OUTPATIENT)
Dept: OUTPATIENT SERVICES | Facility: HOSPITAL | Age: 39
LOS: 1 days | End: 2024-04-05
Payer: COMMERCIAL

## 2024-04-05 ENCOUNTER — APPOINTMENT (OUTPATIENT)
Dept: MAMMOGRAPHY | Facility: IMAGING CENTER | Age: 39
End: 2024-04-05

## 2024-04-05 DIAGNOSIS — R91.1 SOLITARY PULMONARY NODULE: ICD-10-CM

## 2024-04-05 PROCEDURE — 71250 CT THORAX DX C-: CPT | Mod: 26

## 2024-04-05 PROCEDURE — 77067 SCR MAMMO BI INCL CAD: CPT | Mod: 26

## 2024-04-05 PROCEDURE — 77063 BREAST TOMOSYNTHESIS BI: CPT

## 2024-04-05 PROCEDURE — 77063 BREAST TOMOSYNTHESIS BI: CPT | Mod: 26

## 2024-04-05 PROCEDURE — 71250 CT THORAX DX C-: CPT

## 2024-04-05 PROCEDURE — 77067 SCR MAMMO BI INCL CAD: CPT

## 2024-04-17 ENCOUNTER — APPOINTMENT (OUTPATIENT)
Dept: MAMMOGRAPHY | Facility: CLINIC | Age: 39
End: 2024-04-17
Payer: COMMERCIAL

## 2024-04-17 ENCOUNTER — OUTPATIENT (OUTPATIENT)
Dept: OUTPATIENT SERVICES | Facility: HOSPITAL | Age: 39
LOS: 1 days | End: 2024-04-17
Payer: COMMERCIAL

## 2024-04-17 ENCOUNTER — APPOINTMENT (OUTPATIENT)
Dept: ULTRASOUND IMAGING | Facility: CLINIC | Age: 39
End: 2024-04-17
Payer: COMMERCIAL

## 2024-04-17 DIAGNOSIS — Z00.8 ENCOUNTER FOR OTHER GENERAL EXAMINATION: ICD-10-CM

## 2024-04-17 PROCEDURE — 77065 DX MAMMO INCL CAD UNI: CPT | Mod: 26,RT

## 2024-04-17 PROCEDURE — G0279: CPT | Mod: 26

## 2024-04-17 PROCEDURE — 76642 ULTRASOUND BREAST LIMITED: CPT | Mod: 26,RT

## 2024-04-17 PROCEDURE — 77065 DX MAMMO INCL CAD UNI: CPT

## 2024-04-17 PROCEDURE — 76642 ULTRASOUND BREAST LIMITED: CPT

## 2024-04-17 PROCEDURE — G0279: CPT

## 2024-04-30 ENCOUNTER — APPOINTMENT (OUTPATIENT)
Dept: DERMATOLOGY | Facility: CLINIC | Age: 39
End: 2024-04-30
Payer: COMMERCIAL

## 2024-04-30 DIAGNOSIS — D23.9 OTHER BENIGN NEOPLASM OF SKIN, UNSPECIFIED: ICD-10-CM

## 2024-04-30 DIAGNOSIS — I78.8 OTHER DISEASES OF CAPILLARIES: ICD-10-CM

## 2024-04-30 DIAGNOSIS — D22.9 MELANOCYTIC NEVI, UNSPECIFIED: ICD-10-CM

## 2024-04-30 PROCEDURE — 99203 OFFICE O/P NEW LOW 30 MIN: CPT

## 2024-04-30 NOTE — PHYSICAL EXAM
[Alert] : alert [Oriented x 3] : ~L oriented x 3 [Declined] : declined [FreeTextEntry3] : Focused exam: -pink hypopigmented firm papule on R upper arm -mid abdomen brown symmetric macule -L arm tan papule -L lower leg pink patch

## 2024-04-30 NOTE — ASSESSMENT
[FreeTextEntry1] : #dermatofibroma #benign nevi #capillaritis chronic, stable -reassurance regarding benign nature -advised pt to RTC for TBSE given her history of sun exposure    RTC PRN

## 2024-04-30 NOTE — HISTORY OF PRESENT ILLNESS
[FreeTextEntry1] : skin lesions [de-identified] : 40yo F presents for evaluation of skin lesions No personal or family hx of skin cancer hx of blistering sunburns during childhood, hx of tanning bed use - unsure of how many times, but used for a few years skin lesions of concern on the R upper arm, mid abdomen, L arm, L lower leg

## 2024-05-31 ENCOUNTER — APPOINTMENT (OUTPATIENT)
Dept: OBGYN | Facility: CLINIC | Age: 39
End: 2024-05-31
Payer: COMMERCIAL

## 2024-05-31 ENCOUNTER — LABORATORY RESULT (OUTPATIENT)
Age: 39
End: 2024-05-31

## 2024-05-31 VITALS
DIASTOLIC BLOOD PRESSURE: 94 MMHG | BODY MASS INDEX: 39.24 KG/M2 | SYSTOLIC BLOOD PRESSURE: 138 MMHG | WEIGHT: 250 LBS | HEIGHT: 67 IN

## 2024-05-31 DIAGNOSIS — E11.9 TYPE 2 DIABETES MELLITUS W/OUT COMPLICATIONS: ICD-10-CM

## 2024-05-31 PROCEDURE — 36415 COLL VENOUS BLD VENIPUNCTURE: CPT

## 2024-05-31 PROCEDURE — 99386 PREV VISIT NEW AGE 40-64: CPT

## 2024-06-02 LAB
ESTIMATED AVERAGE GLUCOSE: 171 MG/DL
HBA1C MFR BLD HPLC: 7.6 %

## 2024-06-02 NOTE — PLAN
[FreeTextEntry1] : #HCM -Breast exam declined - pt reports last clinical breast exam was in 2019 but admits to doing self-checks. R/B of clinical breast exam and limitation of mammo/sono reviewed w/ pt. last mammo/sono 1 month ago WNL per pt -STI Screening declined -Pap/HPV today -Immunizations: UTD  #h/o DMII -A1C drawn today per pt request  RTO in 1 year for annual GYN exam or PRN for any GYN complaints DAKOTA Soriano MD

## 2024-06-02 NOTE — HISTORY OF PRESENT ILLNESS
[Patient reported mammogram was normal] : Patient reported mammogram was normal [Patient reported breast sonogram was normal] : Patient reported breast sonogram was normal [Patient reported PAP Smear was normal] : Patient reported PAP Smear was normal [No] : Patient does not have concerns regarding sex [Currently Active] : currently active [FreeTextEntry1] : CARTER KHAN 39 year old, G0, LMP: 5/3/24, presents to establish gyn care and for an annual gyn exam. She has normal menses. No vaginal discharge or vaginitis symptoms. She denies abdominal and pelvic pain. No urinary complaints. BM is normal per patient.  Pt is planning to undergo egg preservation with MICHEL Suggs. She reports her last A1C was about 2 months ago but desires a redraw today before the egg retrieval.   Obhx: G0 GYNhx: denies PMH: DMII PSH: denies Med: Metformin 1000 BID, Ozempic All: NKDA Soc: denies Psych: denies Famhx: denies   Health maintenance: Immunizations (flu, COVID): UTD [TextBox_19] : normal per pt [Mammogramdate] : 2024 [BreastSonogramDate] : 2024 [PapSmeardate] : 2019

## 2024-06-02 NOTE — PHYSICAL EXAM
[Chaperone Present] : A chaperone was present in the examining room during all aspects of the physical examination [[id number]] : A chaperone was present during the pelvic exam. [Appropriately responsive] : appropriately responsive [Alert] : alert [No Acute Distress] : no acute distress [No Lymphadenopathy] : no lymphadenopathy [Regular Rate Rhythm] : regular rate rhythm [No Murmurs] : no murmurs [Clear to Auscultation B/L] : clear to auscultation bilaterally [Soft] : soft [Non-tender] : non-tender [Non-distended] : non-distended [No HSM] : No HSM [No Lesions] : no lesions [No Mass] : no mass [Oriented x3] : oriented x3 [Labia Majora] : normal [Labia Minora] : normal [Normal] : normal [Uterine Adnexae] : normal [FreeTextEntry2] : scribe [Declined] : Patient declined rectal exam

## 2024-06-10 ENCOUNTER — NON-APPOINTMENT (OUTPATIENT)
Age: 39
End: 2024-06-10

## 2024-06-10 LAB
CYTOLOGY CVX/VAG DOC THIN PREP: NORMAL
HPV HIGH+LOW RISK DNA PNL CVX: DETECTED

## 2024-06-24 ENCOUNTER — RX RENEWAL (OUTPATIENT)
Age: 39
End: 2024-06-24

## 2024-06-24 RX ORDER — METFORMIN HYDROCHLORIDE 1000 MG/1
1000 TABLET, COATED ORAL TWICE DAILY
Qty: 60 | Refills: 0 | Status: ACTIVE | COMMUNITY
Start: 2024-03-01 | End: 1900-01-01

## 2024-06-28 ENCOUNTER — APPOINTMENT (OUTPATIENT)
Dept: HUMAN REPRODUCTION | Facility: CLINIC | Age: 39
End: 2024-06-28
Payer: COMMERCIAL

## 2024-06-28 ENCOUNTER — APPOINTMENT (OUTPATIENT)
Dept: OBGYN | Facility: CLINIC | Age: 39
End: 2024-06-28

## 2024-06-28 PROCEDURE — 76857 US EXAM PELVIC LIMITED: CPT

## 2024-06-28 PROCEDURE — 36415 COLL VENOUS BLD VENIPUNCTURE: CPT

## 2024-06-28 PROCEDURE — 99213 OFFICE O/P EST LOW 20 MIN: CPT | Mod: 25

## 2024-07-11 ENCOUNTER — APPOINTMENT (OUTPATIENT)
Age: 39
End: 2024-07-11

## 2024-07-17 ENCOUNTER — APPOINTMENT (OUTPATIENT)
Dept: CARDIOLOGY | Facility: CLINIC | Age: 39
End: 2024-07-17

## 2024-07-18 ENCOUNTER — NON-APPOINTMENT (OUTPATIENT)
Age: 39
End: 2024-07-18

## 2024-07-18 ENCOUNTER — APPOINTMENT (OUTPATIENT)
Age: 39
End: 2024-07-18
Payer: COMMERCIAL

## 2024-07-18 VITALS
TEMPERATURE: 97 F | HEIGHT: 67 IN | SYSTOLIC BLOOD PRESSURE: 137 MMHG | HEART RATE: 117 BPM | OXYGEN SATURATION: 97 % | WEIGHT: 246 LBS | DIASTOLIC BLOOD PRESSURE: 97 MMHG | BODY MASS INDEX: 38.61 KG/M2 | RESPIRATION RATE: 16 BRPM

## 2024-07-18 DIAGNOSIS — E78.00 PURE HYPERCHOLESTEROLEMIA, UNSPECIFIED: ICD-10-CM

## 2024-07-18 DIAGNOSIS — E66.9 OBESITY, UNSPECIFIED: ICD-10-CM

## 2024-07-18 DIAGNOSIS — E11.9 TYPE 2 DIABETES MELLITUS W/OUT COMPLICATIONS: ICD-10-CM

## 2024-07-18 DIAGNOSIS — R00.0 TACHYCARDIA, UNSPECIFIED: ICD-10-CM

## 2024-07-18 DIAGNOSIS — R94.31 ABNORMAL ELECTROCARDIOGRAM [ECG] [EKG]: ICD-10-CM

## 2024-07-18 DIAGNOSIS — I10 ESSENTIAL (PRIMARY) HYPERTENSION: ICD-10-CM

## 2024-07-18 PROCEDURE — 99204 OFFICE O/P NEW MOD 45 MIN: CPT | Mod: 25

## 2024-07-18 PROCEDURE — 93000 ELECTROCARDIOGRAM COMPLETE: CPT

## 2024-07-18 RX ORDER — LABETALOL HYDROCHLORIDE 100 MG/1
100 TABLET, FILM COATED ORAL
Qty: 60 | Refills: 3 | Status: ACTIVE | COMMUNITY
Start: 2024-07-18 | End: 1900-01-01

## 2024-07-22 LAB
ALBUMIN SERPL ELPH-MCNC: 4.3 G/DL
ALP BLD-CCNC: 75 U/L
ALT SERPL-CCNC: 68 U/L
ANION GAP SERPL CALC-SCNC: 13 MMOL/L
AST SERPL-CCNC: 44 U/L
BILIRUB SERPL-MCNC: 0.3 MG/DL
BUN SERPL-MCNC: 13 MG/DL
CALCIUM SERPL-MCNC: 9.3 MG/DL
CHLORIDE SERPL-SCNC: 101 MMOL/L
CHOLEST SERPL-MCNC: 186 MG/DL
CO2 SERPL-SCNC: 25 MMOL/L
CREAT SERPL-MCNC: 0.74 MG/DL
EGFR: 105 ML/MIN/1.73M2
GLUCOSE SERPL-MCNC: 112 MG/DL
HCT VFR BLD CALC: 44 %
HDLC SERPL-MCNC: 45 MG/DL
HGB BLD-MCNC: 13.7 G/DL
LDLC SERPL CALC-MCNC: 123 MG/DL
MCHC RBC-ENTMCNC: 28.7 PG
MCHC RBC-ENTMCNC: 31.1 GM/DL
MCV RBC AUTO: 92.2 FL
NONHDLC SERPL-MCNC: 141 MG/DL
PLATELET # BLD AUTO: 342 K/UL
POTASSIUM SERPL-SCNC: 5 MMOL/L
PROT SERPL-MCNC: 7.7 G/DL
RBC # BLD: 4.77 M/UL
RBC # FLD: 13.4 %
SODIUM SERPL-SCNC: 139 MMOL/L
TRIGL SERPL-MCNC: 95 MG/DL
TSH SERPL-ACNC: 0.84 UIU/ML
WBC # FLD AUTO: 14.63 K/UL

## 2024-08-10 ENCOUNTER — APPOINTMENT (OUTPATIENT)
Dept: CARDIOLOGY | Facility: CLINIC | Age: 39
End: 2024-08-10

## 2024-08-10 PROCEDURE — 93306 TTE W/DOPPLER COMPLETE: CPT

## 2024-08-15 ENCOUNTER — APPOINTMENT (OUTPATIENT)
Age: 39
End: 2024-08-15
Payer: COMMERCIAL

## 2024-08-15 VITALS
HEART RATE: 96 BPM | TEMPERATURE: 97.7 F | SYSTOLIC BLOOD PRESSURE: 138 MMHG | DIASTOLIC BLOOD PRESSURE: 96 MMHG | RESPIRATION RATE: 16 BRPM | HEIGHT: 67 IN | WEIGHT: 246 LBS | OXYGEN SATURATION: 98 % | BODY MASS INDEX: 38.61 KG/M2

## 2024-08-15 DIAGNOSIS — I10 ESSENTIAL (PRIMARY) HYPERTENSION: ICD-10-CM

## 2024-08-15 DIAGNOSIS — E78.00 PURE HYPERCHOLESTEROLEMIA, UNSPECIFIED: ICD-10-CM

## 2024-08-15 DIAGNOSIS — E66.9 OBESITY, UNSPECIFIED: ICD-10-CM

## 2024-08-15 DIAGNOSIS — E11.9 TYPE 2 DIABETES MELLITUS W/OUT COMPLICATIONS: ICD-10-CM

## 2024-08-15 PROCEDURE — 99213 OFFICE O/P EST LOW 20 MIN: CPT

## 2024-08-15 NOTE — PHYSICAL EXAM
[Well Developed] : well developed [Well Nourished] : well nourished [No Acute Distress] : no acute distress [Obese] : obese [Normal Venous Pressure] : normal venous pressure [No Carotid Bruit] : no carotid bruit [Normal S1, S2] : normal S1, S2 [No Murmur] : no murmur [No Rub] : no rub [No Gallop] : no gallop [Clear Lung Fields] : clear lung fields [Good Air Entry] : good air entry [No Respiratory Distress] : no respiratory distress  [No Edema] : no edema [No Cyanosis] : no cyanosis [No Clubbing] : no clubbing [Moves all extremities] : moves all extremities [No Focal Deficits] : no focal deficits [Normal Speech] : normal speech [Alert and Oriented] : alert and oriented [Normal memory] : normal memory [de-identified] : Mildly tachy

## 2024-08-15 NOTE — ASSESSMENT
[FreeTextEntry1] : CARTER KHAN is a 39 year F with past medical history significant for DM2, BMI 38, HLD, abn ECG, She is here for pre-op cardiac evaluation as she is planning to have egg preservation.  BP is better but not optimally controlled. Echo showed that she has a structurally normal heart. No cardiac complaints at this time. She reports good exercise capacity. >4METS  - I reviewed labs  - I reviewed echo report - for better BP control --> increase labetalol 200 bid.  - She is stable from cardiac standpoint. There is no cardiac contraindication to having planned low risk procedure. She may proceed to have procedure.  - Increase ambulation as tolerated to aim for approximately 30 minutes of moderate activity 5 days a week.  Heart healthy diet low in sodium, sugars and saturated fats and high in fruits, vegetables and whole grains.  Weight loss.   Patient advised to go to the nearest ED whenever any symptoms persist and/or worsens.  Patient/Family/Caregiver verbalized complete understanding of these instructions.  I spent a total of 25 minutes with more than 50% spent on counseling and coordinating care.  RTO in 6 mo

## 2024-08-15 NOTE — PHYSICAL EXAM
[Well Developed] : well developed [Well Nourished] : well nourished [No Acute Distress] : no acute distress [Obese] : obese [Normal Venous Pressure] : normal venous pressure [No Carotid Bruit] : no carotid bruit [Normal S1, S2] : normal S1, S2 [No Murmur] : no murmur [No Rub] : no rub [No Gallop] : no gallop [Clear Lung Fields] : clear lung fields [Good Air Entry] : good air entry [No Respiratory Distress] : no respiratory distress  [No Edema] : no edema [No Cyanosis] : no cyanosis [No Clubbing] : no clubbing [Moves all extremities] : moves all extremities [No Focal Deficits] : no focal deficits [Normal Speech] : normal speech [Alert and Oriented] : alert and oriented [Normal memory] : normal memory [de-identified] : Mildly tachy

## 2024-08-15 NOTE — REVIEW OF SYSTEMS
[Fever] : no fever [Headache] : no headache [Chills] : no chills [Feeling Fatigued] : not feeling fatigued [SOB] : no shortness of breath [Dyspnea on exertion] : not dyspnea during exertion [Chest Discomfort] : no chest discomfort [Lower Ext Edema] : no extremity edema [Leg Claudication] : no intermittent leg claudication [Palpitations] : no palpitations [Orthopnea] : no orthopnea [PND] : no PND [Syncope] : no syncope [Cough] : no cough [Wheezing] : no wheezing [Coughing Up Blood] : no hemoptysis [Dizziness] : no dizziness [Tremor] : no tremor was seen [Convulsions] : no convulsions [Tingling (Paresthesia)] : no tingling [Weakness] : no weakness [Limb Weakness (Paresis)] : no limb weakness (Paresis) [Speech Disturbance] : no speech disturbance [Confusion] : no confusion was observed [Depression] : no depression [Anxiety] : no anxiety [Under Stress] : not under stress [Suicidal] : not suicidal

## 2024-08-15 NOTE — HISTORY OF PRESENT ILLNESS
[FreeTextEntry1] : CARTER KHAN is a 39 year y.o. F with medical history significant for DM2 (dx 2022), hepatic steatosis, HLD, depression/anxiety disorder, BMI=38. She is here for pre-op cardiac clearance as she is planning to undergo an egg preservation  She was advised to see a cardiologist due to noted elevated BP readings at doctor visits.  DBP in the 90s. She is also noted to have mild sinus tachy.  She recently started ozempic and has lost about 10lbs  She denies CP, SOB, palpitations, orthopnea, dizziness, syncope, LH, edema Patient denies changes in her exercise tolerance during the past 6 months. She can walk 10 blocks and can climb 3 flights of stairs.  Sleeps with 1 pillow  She denies history of MI, CVA, smoking Denies family history of premature ASCVD and /or SCD  No hospitalizations in the past 3 years.    DATA ECG (7/18/24): ST at 104 bpm w nl axis, LVH, PRWP (suggesting old anterior MI) and ST abn (Strain vs ischemia).  LABS (7/18/24): K 5.0, CRE 0.74, AST 44, ALT 68, ALK 75, eGFR 105, TG 95, , HDL 45, , NON-, TSH 0.84, HCT 44.0, HGB 13.7. (5/31/24): A1c 7.6 (1/25/24): A1c 10.2 (1/16/23): Hgb 14.1; Hct 43.5; ESR 41; Homocysteine 8.1; Cre 0.66; K 3.9; eGFR 115; LFTs wnl; TSH 1.16; CRP 33; A1c 7.1; Borrelia AB Neg  ECHO (8/10/24): EF 65-70%. Physiologic MR and TR.   CT chest (2/17/23): Sub 6 mm left apical perifissural nodule, corresponding to finding described on prior MRI. Consider a repeat chest CT in 1yr depending on risk factors. Hepatic steatosis

## 2024-08-23 PROBLEM — R01.1 MURMUR: Status: ACTIVE | Noted: 2024-08-23

## 2024-08-30 ENCOUNTER — APPOINTMENT (OUTPATIENT)
Dept: HUMAN REPRODUCTION | Facility: CLINIC | Age: 39
End: 2024-08-30
Payer: COMMERCIAL

## 2024-08-30 PROCEDURE — 76830 TRANSVAGINAL US NON-OB: CPT

## 2024-08-30 PROCEDURE — 36415 COLL VENOUS BLD VENIPUNCTURE: CPT

## 2024-08-30 PROCEDURE — S4042: CPT

## 2024-08-30 PROCEDURE — 99213 OFFICE O/P EST LOW 20 MIN: CPT | Mod: 25

## 2024-09-02 ENCOUNTER — APPOINTMENT (OUTPATIENT)
Dept: HUMAN REPRODUCTION | Facility: CLINIC | Age: 39
End: 2024-09-02
Payer: COMMERCIAL

## 2024-09-02 PROCEDURE — 99213 OFFICE O/P EST LOW 20 MIN: CPT | Mod: 25

## 2024-09-02 PROCEDURE — 36415 COLL VENOUS BLD VENIPUNCTURE: CPT

## 2024-09-02 PROCEDURE — 76857 US EXAM PELVIC LIMITED: CPT

## 2024-09-04 ENCOUNTER — APPOINTMENT (OUTPATIENT)
Dept: HUMAN REPRODUCTION | Facility: CLINIC | Age: 39
End: 2024-09-04
Payer: COMMERCIAL

## 2024-09-04 PROCEDURE — 76857 US EXAM PELVIC LIMITED: CPT

## 2024-09-04 PROCEDURE — 36415 COLL VENOUS BLD VENIPUNCTURE: CPT

## 2024-09-04 PROCEDURE — 99213 OFFICE O/P EST LOW 20 MIN: CPT | Mod: 25

## 2024-09-06 ENCOUNTER — APPOINTMENT (OUTPATIENT)
Dept: HUMAN REPRODUCTION | Facility: CLINIC | Age: 39
End: 2024-09-06
Payer: COMMERCIAL

## 2024-09-06 PROCEDURE — 99213 OFFICE O/P EST LOW 20 MIN: CPT | Mod: 25

## 2024-09-06 PROCEDURE — 99459 PELVIC EXAMINATION: CPT

## 2024-09-06 PROCEDURE — 76857 US EXAM PELVIC LIMITED: CPT

## 2024-09-06 PROCEDURE — 36415 COLL VENOUS BLD VENIPUNCTURE: CPT

## 2024-09-09 ENCOUNTER — APPOINTMENT (OUTPATIENT)
Dept: HUMAN REPRODUCTION | Facility: CLINIC | Age: 39
End: 2024-09-09
Payer: COMMERCIAL

## 2024-09-09 PROCEDURE — 99213 OFFICE O/P EST LOW 20 MIN: CPT | Mod: 25

## 2024-09-09 PROCEDURE — 36415 COLL VENOUS BLD VENIPUNCTURE: CPT

## 2024-09-09 PROCEDURE — 76857 US EXAM PELVIC LIMITED: CPT

## 2024-09-10 ENCOUNTER — APPOINTMENT (OUTPATIENT)
Dept: HUMAN REPRODUCTION | Facility: CLINIC | Age: 39
End: 2024-09-10
Payer: COMMERCIAL

## 2024-09-10 PROCEDURE — 76857 US EXAM PELVIC LIMITED: CPT

## 2024-09-10 PROCEDURE — 99213 OFFICE O/P EST LOW 20 MIN: CPT | Mod: 25

## 2024-09-10 PROCEDURE — 36415 COLL VENOUS BLD VENIPUNCTURE: CPT

## 2024-09-11 ENCOUNTER — APPOINTMENT (OUTPATIENT)
Dept: HUMAN REPRODUCTION | Facility: CLINIC | Age: 39
End: 2024-09-11
Payer: COMMERCIAL

## 2024-09-11 PROCEDURE — 36415 COLL VENOUS BLD VENIPUNCTURE: CPT

## 2024-09-12 ENCOUNTER — OUTPATIENT (OUTPATIENT)
Dept: OUTPATIENT SERVICES | Facility: HOSPITAL | Age: 39
LOS: 1 days | End: 2024-09-12
Payer: COMMERCIAL

## 2024-09-12 ENCOUNTER — TRANSCRIPTION ENCOUNTER (OUTPATIENT)
Age: 39
End: 2024-09-12

## 2024-09-12 ENCOUNTER — APPOINTMENT (OUTPATIENT)
Dept: HUMAN REPRODUCTION | Facility: HOSPITAL | Age: 39
End: 2024-09-12
Payer: COMMERCIAL

## 2024-09-12 VITALS
HEART RATE: 79 BPM | OXYGEN SATURATION: 97 % | DIASTOLIC BLOOD PRESSURE: 73 MMHG | RESPIRATION RATE: 16 BRPM | SYSTOLIC BLOOD PRESSURE: 97 MMHG | TEMPERATURE: 97 F

## 2024-09-12 VITALS
OXYGEN SATURATION: 97 % | WEIGHT: 145.06 LBS | HEIGHT: 66 IN | TEMPERATURE: 97 F | DIASTOLIC BLOOD PRESSURE: 85 MMHG | RESPIRATION RATE: 17 BRPM | SYSTOLIC BLOOD PRESSURE: 129 MMHG | HEART RATE: 90 BPM

## 2024-09-12 DIAGNOSIS — N97.9 FEMALE INFERTILITY, UNSPECIFIED: ICD-10-CM

## 2024-09-12 LAB — GLUCOSE BLDC GLUCOMTR-MCNC: 132 MG/DL — HIGH (ref 70–99)

## 2024-09-12 PROCEDURE — 58970 RETRIEVAL OF OOCYTE: CPT

## 2024-09-12 PROCEDURE — 89337 CRYOPRESERVATION OOCYTE(S): CPT

## 2024-09-12 PROCEDURE — 89254 OOCYTE IDENTIFICATION: CPT

## 2024-09-12 PROCEDURE — 76948 ECHO GUIDE OVA ASPIRATION: CPT | Mod: 26

## 2024-09-12 PROCEDURE — 82962 GLUCOSE BLOOD TEST: CPT

## 2024-09-12 PROCEDURE — 89346 STORAGE/YEAR OOCYTE(S): CPT

## 2024-09-12 RX ORDER — SEMAGLUTIDE 1 MG/.5ML
0.5 INJECTION, SOLUTION SUBCUTANEOUS
Refills: 0 | DISCHARGE

## 2024-09-12 RX ORDER — METFORMIN HYDROCHLORIDE 850 MG/1
0 TABLET, FILM COATED ORAL
Refills: 0 | DISCHARGE

## 2024-09-12 RX ORDER — METFORMIN HYDROCHLORIDE 850 MG/1
1 TABLET, FILM COATED ORAL
Refills: 0 | DISCHARGE

## 2024-09-12 NOTE — ASU PATIENT PROFILE, ADULT - FALL HARM RISK - UNIVERSAL INTERVENTIONS
Bed in lowest position, wheels locked, appropriate side rails in place/Call bell, personal items and telephone in reach/Instruct patient to call for assistance before getting out of bed or chair/Non-slip footwear when patient is out of bed/[place] to call system/Physically safe environment - no spills, clutter or unnecessary equipment/Purposeful Proactive Rounding/Room/bathroom lighting operational, light cord in reach

## 2024-09-12 NOTE — ASU DISCHARGE PLAN (ADULT/PEDIATRIC) - NS MD DC FALL RISK RISK
medical
For information on Fall & Injury Prevention, visit: [email or web address] OR  [email or web address] OR  https://www.cdc.gov/steadi/patient.html

## 2024-09-12 NOTE — ASU DISCHARGE PLAN (ADULT/PEDIATRIC) - NURSING INSTRUCTIONS
OK to take Tylenol/Acetaminophen at 6PM TONIGHT (last dose @  12PM   in operating room)  for pain and every 6 hours after as needed. OK to take Motrin/Ibuprofen at 6:15PM TONIGHT (last dose @  12:15PM   in operating room) for pain and every 6 hours after as needed.

## 2024-09-12 NOTE — ASU DISCHARGE PLAN (ADULT/PEDIATRIC) - ASU DC SPECIAL INSTRUCTIONSFT
Nothing per vagina for 2 weeks- no douching, tampons, sitz baths, sexual intercourse.  Call your doctor and go to the ER if you experience severe discomfort, chest pain, shortness of breath, fever greater than 100.4, of excessive vaginal bleeding greater than 1 pad/hr for 2 consecutive hours.  Take over the counter and prescribed medications for pain control as directed. Follow up with your doctor in 2 weeks. Nothing per vagina for 2 weeks- no douching, tampons, sitz baths, sexual intercourse.    ******************************************************************************************   Call your doctor and go to the ER if you experience severe discomfort, chest pain, shortness of breath, fever greater than 100.4, of excessive vaginal bleeding greater than 1 pad/hr for 2 consecutive hours.  ******************************************************************************************     Take over the counter and prescribed medications for pain control as directed. Follow up with your doctor in 2 weeks.

## 2024-09-13 ENCOUNTER — APPOINTMENT (OUTPATIENT)
Dept: HUMAN REPRODUCTION | Facility: CLINIC | Age: 39
End: 2024-09-13
Payer: COMMERCIAL

## 2024-12-03 NOTE — ASU PREOP CHECKLIST - NS PREOP CHK HIBICLENS NA
Pt ambulatory to triage c/o nausea xs 1 week. Pt concerned for possible pregnancy. Pt reports she is taking the depo shot and is supposed to receive another dose on 12/22.   N/A

## 2025-03-20 ENCOUNTER — APPOINTMENT (OUTPATIENT)
Age: 40
End: 2025-03-20

## 2025-05-07 ENCOUNTER — TRANSCRIPTION ENCOUNTER (OUTPATIENT)
Age: 40
End: 2025-05-07

## 2025-05-13 ENCOUNTER — TRANSCRIPTION ENCOUNTER (OUTPATIENT)
Age: 40
End: 2025-05-13

## 2025-07-03 ENCOUNTER — RX RENEWAL (OUTPATIENT)
Age: 40
End: 2025-07-03

## 2025-07-07 ENCOUNTER — RX RENEWAL (OUTPATIENT)
Age: 40
End: 2025-07-07

## (undated) DEVICE — ULTRASOUND PROBE COVER IVF 6 X 24

## (undated) DEVICE — TUBING SUCTION CONN 6FT STERILE

## (undated) DEVICE — NDL FOLLICLE ASP 1.6 X 900

## (undated) DEVICE — SYR ASEPTO

## (undated) DEVICE — DRAPE LIGHT HANDLE COVER (GREEN)

## (undated) DEVICE — DRAPE 1/2 SHEET 40X57"

## (undated) DEVICE — PACK LITHOTOMY

## (undated) DEVICE — DRAPE SURGICAL #1010

## (undated) DEVICE — SOL IRR POUR NS 0.9% 500ML